# Patient Record
Sex: FEMALE | Race: WHITE | NOT HISPANIC OR LATINO | Employment: UNEMPLOYED | URBAN - NONMETROPOLITAN AREA
[De-identification: names, ages, dates, MRNs, and addresses within clinical notes are randomized per-mention and may not be internally consistent; named-entity substitution may affect disease eponyms.]

---
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
## 2025-02-10 ENCOUNTER — OFFICE VISIT (OUTPATIENT)
Dept: URGENT CARE | Facility: URGENT CARE | Age: 20
End: 2025-02-10
Payer: COMMERCIAL

## 2025-02-10 VITALS
BODY MASS INDEX: 23.05 KG/M2 | TEMPERATURE: 98.8 F | OXYGEN SATURATION: 98 % | HEART RATE: 74 BPM | RESPIRATION RATE: 16 BRPM | SYSTOLIC BLOOD PRESSURE: 103 MMHG | WEIGHT: 126 LBS | DIASTOLIC BLOOD PRESSURE: 70 MMHG

## 2025-02-10 DIAGNOSIS — Z20.818 PERTUSSIS EXPOSURE: Primary | ICD-10-CM

## 2025-02-10 PROCEDURE — 99204 OFFICE O/P NEW MOD 45 MIN: CPT | Performed by: PHYSICIAN ASSISTANT

## 2025-02-10 RX ORDER — AZITHROMYCIN 250 MG/1
TABLET, FILM COATED ORAL
Qty: 6 TABLET | Refills: 0 | Status: SHIPPED | OUTPATIENT
Start: 2025-02-10 | End: 2025-02-11

## 2025-02-10 RX ORDER — PREDNISONE 20 MG/1
20 TABLET ORAL 2 TIMES DAILY
Qty: 10 TABLET | Refills: 0 | Status: SHIPPED | OUTPATIENT
Start: 2025-02-10 | End: 2025-02-11

## 2025-02-10 NOTE — PROGRESS NOTES
SUBJECTIVE:  Leana Reyna is a 20 year old female who presents to the clinic today with a chief complaint of cough  for 2 day(s).  Her cough is described as nonproductive and wheezing.    The patient's symptoms are moderate and stable.  Associated symptoms include none. The patient's symptoms are exacerbated by no particular triggers  Patient has been using nothing  to improve symptoms. Boyfriend tested positive for whooping cough last week.    Past Medical History:   Diagnosis Date    Depressive disorder 9/21       Current Outpatient Medications   Medication Sig Dispense Refill    clonazePAM (KLONOPIN) 0.5 MG tablet Take 0.25 mg by mouth nightly as needed for anxiety      norethindrone-ethinyl estradiol (NORTREL 1/35, 28,) 1-35 MG-MCG tablet Take 1 tablet by mouth daily 84 tablet 3    PARoxetine (PAXIL) 20 MG tablet Take 1 tablet by mouth daily at 2 pm         Social History     Tobacco Use    Smoking status: Never    Smokeless tobacco: Never   Substance Use Topics    Alcohol use: Yes     Comment: rare       ROS  Review of systems negative except as stated above.    OBJECTIVE:  /70 (BP Location: Right arm, Patient Position: Sitting, Cuff Size: Adult Regular)   Pulse 74   Temp 98.8  F (37.1  C) (Tympanic)   Resp 16   Wt 57.2 kg (126 lb)   LMP 01/19/2025 (Approximate)   SpO2 98%   BMI 23.05 kg/m    GENERAL APPEARANCE: healthy, alert and no distress  HENT: ear canals and TM's normal.  Nose and mouth without ulcers, erythema or lesions  RESP: lungs clear to auscultation - no rales, rhonchi or wheezes  CV: regular rates and rhythm, normal S1 S2, no murmur noted  ABDOMEN:  soft, nontender, no HSM or masses and bowel sounds normal  NEURO: Normal strength and tone, sensory exam grossly normal,  normal speech and mentation  SKIN: no suspicious lesions or rashes      ASSESSMENT:    (Z20.818) Pertussis exposure  (primary encounter diagnosis)  Plan: predniSONE (DELTASONE) 20 MG tablet,         azithromycin  (ZITHROMAX) 250 MG tablet       Red flags and emergent follow up discussed, and understood by patient  Follow up with PCP if symptoms worsen or fail to improve

## 2025-02-11 ENCOUNTER — TELEPHONE (OUTPATIENT)
Dept: URGENT CARE | Facility: URGENT CARE | Age: 20
End: 2025-02-11
Payer: COMMERCIAL

## 2025-02-11 DIAGNOSIS — Z20.818 PERTUSSIS EXPOSURE: ICD-10-CM

## 2025-02-11 RX ORDER — PREDNISONE 20 MG/1
20 TABLET ORAL 2 TIMES DAILY
Qty: 10 TABLET | Refills: 0 | Status: SHIPPED | OUTPATIENT
Start: 2025-02-11

## 2025-02-11 RX ORDER — AZITHROMYCIN 250 MG/1
TABLET, FILM COATED ORAL
Qty: 6 TABLET | Refills: 0 | Status: SHIPPED | OUTPATIENT
Start: 2025-02-11 | End: 2025-02-16

## 2025-02-11 RX ORDER — PREDNISONE 20 MG/1
20 TABLET ORAL 2 TIMES DAILY
Qty: 10 TABLET | Refills: 0 | Status: SHIPPED | OUTPATIENT
Start: 2025-02-11 | End: 2025-02-16

## 2025-02-11 NOTE — TELEPHONE ENCOUNTER
Pharmacy called spoke with a pharmacy staff they said that there was an outage of power yesterday around the time the prescriptions were sent and they did not have prescriptions on file therefore prednisone and azithromycin were resent to [facility] pharmacy on [address] in [place]  Spoke with patient explained the situation to her patient will  prescriptions today    Crystal Rico CNP

## 2025-02-11 NOTE — TELEPHONE ENCOUNTER
General Call      Reason for Call:  Medication     What are your questions or concerns:  Pt went to go  medication at pharmacy yesterday after she was seen which was 02/10/2025 and pharmacist said her prescription wasn't there . Pt would like her medication to be sent to the [facility] in [facility] on [address]. Please call pt to confirm when this has been sent over.     Date of last appointment with provider: 02/10/2025    Could we send this information to you in [facility] or would you prefer to receive a phone call?:   Patient would prefer a phone call   Okay to leave a detailed message?: Yes at Cell number on file:    Telephone Information:   Mobile [phone]

## 2025-05-29 DIAGNOSIS — R05.3 CHRONIC COUGH: Primary | ICD-10-CM

## 2025-05-29 RX ORDER — CETIRIZINE HYDROCHLORIDE 10 MG/1
10 TABLET ORAL DAILY
Qty: 30 TABLET | Refills: 0 | Status: SHIPPED | OUTPATIENT
Start: 2025-05-29

## 2025-05-29 NOTE — TELEPHONE ENCOUNTER
Cetirizine HCl 10 MG      Last Written Prescription Date:  not listed on current medication list.  Last Fill Quantity: n/a,   # refills: n/a  Last Office Visit: 08/20/24  Future Office visit:       Routing refill request to provider for review/approval because:  Drug not active on patient's medication list     no